# Patient Record
Sex: MALE | Race: BLACK OR AFRICAN AMERICAN | NOT HISPANIC OR LATINO
[De-identification: names, ages, dates, MRNs, and addresses within clinical notes are randomized per-mention and may not be internally consistent; named-entity substitution may affect disease eponyms.]

---

## 2017-11-12 ENCOUNTER — HOSPITAL (OUTPATIENT)
Dept: OTHER | Age: 22
End: 2017-11-12
Attending: NURSE PRACTITIONER

## 2020-02-23 ENCOUNTER — HOSPITAL ENCOUNTER (EMERGENCY)
Facility: OTHER | Age: 25
Discharge: HOME | End: 2020-02-23
Attending: FAMILY MEDICINE | Admitting: FAMILY MEDICINE
Payer: COMMERCIAL

## 2020-02-23 VITALS
WEIGHT: 220 LBS | DIASTOLIC BLOOD PRESSURE: 70 MMHG | HEIGHT: 70 IN | RESPIRATION RATE: 16 BRPM | HEART RATE: 80 BPM | BODY MASS INDEX: 31.5 KG/M2 | TEMPERATURE: 98.7 F | SYSTOLIC BLOOD PRESSURE: 140 MMHG | OXYGEN SATURATION: 99 %

## 2020-02-23 DIAGNOSIS — R05.9 COUGH: ICD-10-CM

## 2020-02-23 DIAGNOSIS — J20.9 ACUTE BRONCHITIS, UNSPECIFIED ORGANISM: ICD-10-CM

## 2020-02-23 LAB
FLUAV+FLUBV RNA SPEC QL NAA+PROBE: NEGATIVE
FLUAV+FLUBV RNA SPEC QL NAA+PROBE: NEGATIVE
RSV RNA SPEC NAA+PROBE: NEGATIVE
SPECIMEN SOURCE: NORMAL
SPECIMEN SOURCE: NORMAL
STREP GROUP A PCR: NOT DETECTED

## 2020-02-23 PROCEDURE — 99283 EMERGENCY DEPT VISIT LOW MDM: CPT | Mod: Z6 | Performed by: FAMILY MEDICINE

## 2020-02-23 PROCEDURE — 94640 AIRWAY INHALATION TREATMENT: CPT

## 2020-02-23 PROCEDURE — 25000125 ZZHC RX 250: Performed by: FAMILY MEDICINE

## 2020-02-23 PROCEDURE — 87651 STREP A DNA AMP PROBE: CPT | Performed by: FAMILY MEDICINE

## 2020-02-23 PROCEDURE — 99283 EMERGENCY DEPT VISIT LOW MDM: CPT | Performed by: FAMILY MEDICINE

## 2020-02-23 PROCEDURE — 87631 RESP VIRUS 3-5 TARGETS: CPT | Performed by: FAMILY MEDICINE

## 2020-02-23 PROCEDURE — 40000275 ZZH STATISTIC RCP TIME EA 10 MIN

## 2020-02-23 RX ORDER — AZITHROMYCIN 250 MG/1
250 TABLET, FILM COATED ORAL DAILY
Qty: 6 TABLET | Refills: 0 | Status: SHIPPED | OUTPATIENT
Start: 2020-02-23

## 2020-02-23 RX ORDER — IPRATROPIUM BROMIDE AND ALBUTEROL SULFATE 2.5; .5 MG/3ML; MG/3ML
3 SOLUTION RESPIRATORY (INHALATION) ONCE
Status: COMPLETED | OUTPATIENT
Start: 2020-02-23 | End: 2020-02-23

## 2020-02-23 RX ORDER — ALBUTEROL SULFATE 90 UG/1
2 AEROSOL, METERED RESPIRATORY (INHALATION) EVERY 4 HOURS PRN
Qty: 1 INHALER | Refills: 0 | Status: SHIPPED | OUTPATIENT
Start: 2020-02-23 | End: 2020-03-24

## 2020-02-23 RX ADMIN — IPRATROPIUM BROMIDE AND ALBUTEROL SULFATE 3 ML: .5; 3 SOLUTION RESPIRATORY (INHALATION) at 09:17

## 2020-02-23 ASSESSMENT — ENCOUNTER SYMPTOMS
FEVER: 0
EYE DISCHARGE: 0
MYALGIAS: 0
SINUS PRESSURE: 0
SORE THROAT: 1
SHORTNESS OF BREATH: 0
SINUS CONGESTION: 0
GASTROINTESTINAL NEGATIVE: 1
HEADACHES: 0
COUGH: 1
RHINORRHEA: 0
CHILLS: 1
DIAPHORESIS: 0
PSYCHIATRIC NEGATIVE: 1
SINUS PAIN: 0
FACIAL SWELLING: 0
WEIGHT LOSS: 0

## 2020-02-23 ASSESSMENT — MIFFLIN-ST. JEOR: SCORE: 2004.16

## 2020-02-23 NOTE — ED TRIAGE NOTES
Pt here with friends with c/o sore throat and cough x 2 weeks, VSS, no acute distress, pt brought back into Er to be evaluated

## 2020-02-23 NOTE — ED PROVIDER NOTES
History     Chief Complaint   Patient presents with     Cough     Pharyngitis       Cough   Cough characteristics:  Productive  Severity:  Moderate  Onset quality:  Gradual  Duration:  2 weeks  Timing:  Intermittent  Progression:  Unchanged  Chronicity:  New  Smoker: yes    Context: sick contacts and upper respiratory infection    Context: not animal exposure, not exposure to allergens, not fumes, not occupational exposure, not smoke exposure, not weather changes and not with activity    Relieved by: cough drops   Worsened by:  Nothing  Ineffective treatments:  None tried  Associated symptoms: chills and sore throat    Associated symptoms: no chest pain, no diaphoresis, no ear fullness, no ear pain, no eye discharge, no fever, no headaches, no myalgias, no rash, no rhinorrhea, no shortness of breath, no sinus congestion and no weight loss    Risk factors: no chemical exposure, no recent infection and no recent travel      Jt Phillips is a 24 year old male who presents with cough for last 2 weeks     Allergies:  No Known Allergies    Problem List:    There are no active problems to display for this patient.       Past Medical History:    No past medical history on file.    Past Surgical History:    No past surgical history on file.    Family History:    No family history on file.    Social History:  Marital Status:  Single [1]  Social History     Tobacco Use     Smoking status: Not on file   Substance Use Topics     Alcohol use: Not on file     Drug use: Not on file        Medications:    No current outpatient medications on file.        Review of Systems   Constitutional: Positive for chills. Negative for diaphoresis, fever and weight loss.   HENT: Positive for congestion and sore throat. Negative for ear discharge, ear pain, facial swelling, nosebleeds, postnasal drip, rhinorrhea, sinus pressure, sinus pain and sneezing.    Eyes: Negative for discharge.   Respiratory: Positive for cough. Negative for  "shortness of breath.    Cardiovascular: Negative for chest pain.   Gastrointestinal: Negative.    Genitourinary: Negative.    Musculoskeletal: Negative for myalgias.   Skin: Negative for rash.   Neurological: Negative for headaches.   Psychiatric/Behavioral: Negative.        Physical Exam   BP: (!) 146/66  Pulse: 83  Temp: 98.7  F (37.1  C)  Resp: 16  Height: 177.8 cm (5' 10\")  Weight: 99.8 kg (220 lb)  SpO2: 100 %      Physical Exam  Vitals signs and nursing note reviewed.   Constitutional:       General: He is not in acute distress.     Appearance: He is well-developed. He is not ill-appearing.   HENT:      Head: Normocephalic and atraumatic.      Right Ear: Tympanic membrane normal.      Left Ear: Tympanic membrane normal.      Mouth/Throat:      Mouth: Mucous membranes are moist.   Eyes:      Conjunctiva/sclera: Conjunctivae normal.   Neck:      Musculoskeletal: Normal range of motion and neck supple.   Cardiovascular:      Rate and Rhythm: Normal rate and regular rhythm.   Pulmonary:      Comments: Markedly diminished breath sounds bilaterally . No wheeze   Abdominal:      Palpations: Abdomen is soft.   Skin:     General: Skin is warm and dry.   Neurological:      Mental Status: He is alert.         ED Course        Procedures  Patient presents emergency department with complaint of 2-week history of cough associated  associated with sore throat.  Patient triaged to exam room.  Vital signs reviewed.  Patient without hypoxia or any other signs of respiratory distress.  Medical history exam completed patient with markedly diminished breath sounds.  Rapid strep and influenza negative.  Patient given single DuoNeb with improvement in aeration.  Patient diagnosed with acute bronchitis with bronchospasm.  Patient instructed on use of albuterol MDI.  Patient discharged with prescription for Zithromax and albuterol as needed.  Patient education provided before discharge.  Return to ER as needed for worsening or " concerning symptoms.        {         Results for orders placed or performed during the hospital encounter of 02/23/20 (from the past 24 hour(s))   Group A Streptococcus PCR Throat Swab   Result Value Ref Range    Specimen Description Throat     Strep Group A PCR Not Detected NDET^Not Detected   Influenza A and B and RSV PCR   Result Value Ref Range    Specimen Description Nasopharyngeal     Influenza A PCR Negative NEG^Negative    Influenza B PCR Negative NEG^Negative    Resp Syncytial Virus Negative NEG^Negative       Medications - No data to display    Assessments & Plan (with Medical Decision Making)     I have reviewed the nursing notes.    I have reviewed the findings, diagnosis, plan and need for follow up with the patient.      New Prescriptions    No medications on file       Final diagnoses:   None   (J20.9) Acute bronchitis, unspecified organism    (R05) Cough        2/23/2020   [facility] Candis Gale MD  02/28/20 0900

## 2020-02-23 NOTE — ED AVS SNAPSHOT
[facility]  [address]  [place] [address]  Phone:  [phone]  Fax:  [phone]                                    Jt Phillips   MRN: [id number]    Department:  [facility]   Date of Visit:  2/23/2020           After Visit Summary Signature Page    I have received my discharge instructions, and my questions have been answered. I have discussed any challenges I see with this plan with the nurse or doctor.    ..........................................................................................................................................  Patient/Patient Representative Signature      ..........................................................................................................................................  Patient Representative Print Name and Relationship to Patient    ..................................................               ................................................  Date                                   Time    ..........................................................................................................................................  Reviewed by Signature/Title    ...................................................              ..............................................  Date                                               Time          22EPIC Rev 08/18

## 2020-02-25 ENCOUNTER — APPOINTMENT (OUTPATIENT)
Dept: GENERAL RADIOLOGY | Facility: OTHER | Age: 25
End: 2020-02-25
Attending: FAMILY MEDICINE
Payer: COMMERCIAL

## 2020-02-25 ENCOUNTER — HOSPITAL ENCOUNTER (EMERGENCY)
Facility: OTHER | Age: 25
Discharge: HOME | End: 2020-02-25
Attending: FAMILY MEDICINE | Admitting: FAMILY MEDICINE
Payer: COMMERCIAL

## 2020-02-25 VITALS
HEIGHT: 70 IN | OXYGEN SATURATION: 98 % | RESPIRATION RATE: 16 BRPM | HEART RATE: 76 BPM | BODY MASS INDEX: 31.5 KG/M2 | WEIGHT: 220 LBS | SYSTOLIC BLOOD PRESSURE: 130 MMHG | DIASTOLIC BLOOD PRESSURE: 82 MMHG | TEMPERATURE: 99.2 F

## 2020-02-25 DIAGNOSIS — J40 BRONCHITIS: ICD-10-CM

## 2020-02-25 DIAGNOSIS — J20.9 ACUTE BRONCHITIS, UNSPECIFIED ORGANISM: ICD-10-CM

## 2020-02-25 PROCEDURE — 99283 EMERGENCY DEPT VISIT LOW MDM: CPT | Mod: 25 | Performed by: FAMILY MEDICINE

## 2020-02-25 PROCEDURE — 25000125 ZZHC RX 250: Performed by: FAMILY MEDICINE

## 2020-02-25 PROCEDURE — 99282 EMERGENCY DEPT VISIT SF MDM: CPT | Mod: Z6 | Performed by: FAMILY MEDICINE

## 2020-02-25 PROCEDURE — 71046 X-RAY EXAM CHEST 2 VIEWS: CPT

## 2020-02-25 RX ORDER — ALBUTEROL SULFATE 0.83 MG/ML
2.5 SOLUTION RESPIRATORY (INHALATION)
Status: DISCONTINUED | OUTPATIENT
Start: 2020-02-25 | End: 2020-02-26 | Stop reason: HOSPADM

## 2020-02-25 RX ADMIN — ALBUTEROL SULFATE 2.5 MG: 2.5 SOLUTION RESPIRATORY (INHALATION) at 21:43

## 2020-02-25 ASSESSMENT — MIFFLIN-ST. JEOR: SCORE: 2004.16

## 2020-02-25 ASSESSMENT — ENCOUNTER SYMPTOMS
EYES NEGATIVE: 1
WHEEZING: 0
GASTROINTESTINAL NEGATIVE: 1
NEUROLOGICAL NEGATIVE: 1
COUGH: 1
SORE THROAT: 1
FEVER: 0
SHORTNESS OF BREATH: 0
CHILLS: 0
CHEST TIGHTNESS: 1

## 2020-02-25 NOTE — LETTER
[facility]  [address]  [place] [address]  [phone]          February 25, 2020    RE:  Jt Phillips                                                                                                                                                       [address] [address] [place] [id number]            To whom it may concern:    Jt Phillips is under my professional care for Bronchitis He  may return to work on 2/27/2020 after he's been on anti-biotics for 72 hours.        Sincerely,    Salazar Wiggins MD on 2/25/2020 at 10:25 PM

## 2020-02-26 NOTE — ED TRIAGE NOTES
Pt presents to ED with c/o flu symptoms, aching, cough, sore throat. Pt was seen in ED 2 days ago, was negative for flu then, was dx with bronchitis and given inhaler. Pt states symptoms have worsened, inhaler not working.  Lisa Muller, RN

## 2020-02-26 NOTE — ED PROVIDER NOTES
History     Chief Complaint   Patient presents with     Cough     HPI  Jt Phillips is a 24 year old male who presents with cough. It started two weeks ago and he thinks his symptoms have been fluctuating. He notes the symptoms started with a sore throat and he still feels somewhat sore. He was recently in ED a few days ago with his friends who all tested positive for Influenza. His test was negative. He notes his cough is worse when he is outside in the cold. He has no SOB or chest pain. He does have some chest pressure when he coughs. His cough is productive of yellow sputum. No history of asthma.     Reviewed RN notes below, same history relayed to me    Pt presents to ED with c/o flu symptoms, aching, cough, sore throat. Pt was seen in ED 2 days ago, was negative for flu then, was dx with bronchitis and given inhaler. Pt states symptoms have worsened, inhaler not working.  Allergies:  No Known Allergies    Problem List:    There are no active problems to display for this patient.       Past Medical History:    History reviewed. No pertinent past medical history.    Past Surgical History:    History reviewed. No pertinent surgical history.    Family History:    History reviewed. No pertinent family history.    Social History:  Marital Status:  Single [1]  Social History     Tobacco Use     Smoking status: Never Smoker     Smokeless tobacco: Never Used     Tobacco comment: smokes weed   Substance Use Topics     Alcohol use: None     Drug use: Yes     Types: Marijuana     Comment: every day        Medications:    albuterol (PROAIR HFA/PROVENTIL HFA/VENTOLIN HFA) 108 (90 Base) MCG/ACT inhaler  azithromycin (ZITHROMAX) 250 MG tablet          Review of Systems   Constitutional: Negative for chills and fever.   HENT: Positive for sore throat.    Eyes: Negative.    Respiratory: Positive for cough and chest tightness. Negative for shortness of breath and wheezing.    Cardiovascular: Negative for chest pain.  "  Gastrointestinal: Negative.    Genitourinary: Negative.    Neurological: Negative.        Physical Exam   BP: 130/82  Pulse: 76  Heart Rate: 84  Temp: 99.2  F (37.3  C)  Resp: 16  Height: 177.8 cm (5' 10\")  Weight: 99.8 kg (220 lb)  SpO2: 98 %      Physical Exam  Constitutional:       Appearance: Normal appearance.   HENT:      Mouth/Throat:      Mouth: Mucous membranes are moist.      Pharynx: Oropharynx is clear.   Eyes:      Pupils: Pupils are equal, round, and reactive to light.   Cardiovascular:      Rate and Rhythm: Normal rate and regular rhythm.   Pulmonary:      Effort: Pulmonary effort is normal.      Breath sounds: Normal breath sounds.   Abdominal:      General: Abdomen is flat. Bowel sounds are normal.   Skin:     General: Skin is warm and dry.   Neurological:      General: No focal deficit present.      Mental Status: He is alert.   Psychiatric:         Mood and Affect: Mood normal.         ED Course        Procedures     Results for orders placed or performed during the hospital encounter of 02/25/20 (from the past 24 hour(s))   XR Chest 2 Views    Narrative    Procedure:XR CHEST 2 VW    Clinical history:Male, 24 years, sob cough    Technique: Two views are submitted.    Comparison: None    Findings: The cardiac silhouette is normal. The pulmonary vasculature  is normal.    The lungs are clear. Bony structures are unremarkable.      Impression    Impression:   No acute abnormality. No evidence of acute or active disease.    MICAELA VÁZQUEZ MD   CXR: Negative     Medications   albuterol (PROVENTIL) neb solution 2.5 mg (2.5 mg Nebulization Given 2/25/20 2143)       Assessments & Plan (with Medical Decision Making)     I have reviewed the nursing notes.    I have reviewed the findings, diagnosis, plan and need for follow up with the patient.      New Prescriptions    No medications on file       Final diagnoses:   Bronchitis   Patient has had symptoms for 1 week, that started with a sore throat and " now has a cough and some chest pressure. The cough is productive of yellow sputum. He was recently in the ED with some friends/family who tested positive for Influenza, he was negative. He states his symptoms have fluctuated since starting a week ago. Most likely still has acute bronchitis, he was prescribed Azithromycin and an inhaler when he was in the ED last. On lung exam, no wheezes were heard. He was given a Nebulizer here which helped his breathing. He should continue with the medications he was prescribed and follow up with PCP if symptoms worsen or don't get better. He also could have an underlying asthma that has not been diagnosed yet.     Note started by MS3 TIFFANIE,  I revised, edited and addended note as needed.  In addition patient was seen and examined by myself including both history and physical examination. My findings are reflected in the note above.        Guadalupe Stephens, MS3, RPAP student   Working under the supervision of Dr. Feliciano MD      2/25/2020   [facility]  03/02/20 0887

## (undated) RX ORDER — IPRATROPIUM BROMIDE AND ALBUTEROL SULFATE 2.5; .5 MG/3ML; MG/3ML
SOLUTION RESPIRATORY (INHALATION)
Status: DISPENSED
Start: 2020-02-23

## (undated) RX ORDER — ALBUTEROL SULFATE 0.83 MG/ML
SOLUTION RESPIRATORY (INHALATION)
Status: DISPENSED
Start: 2020-02-25